# Patient Record
Sex: MALE | Race: WHITE | NOT HISPANIC OR LATINO | URBAN - METROPOLITAN AREA
[De-identification: names, ages, dates, MRNs, and addresses within clinical notes are randomized per-mention and may not be internally consistent; named-entity substitution may affect disease eponyms.]

---
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
## 2024-01-23 ENCOUNTER — OFFICE VISIT (OUTPATIENT)
Dept: PRIMARY CARE | Facility: CLINIC | Age: 19
End: 2024-01-23
Payer: COMMERCIAL

## 2024-01-23 VITALS
BODY MASS INDEX: 18.97 KG/M2 | SYSTOLIC BLOOD PRESSURE: 104 MMHG | HEIGHT: 70 IN | HEART RATE: 70 BPM | OXYGEN SATURATION: 97 % | WEIGHT: 132.5 LBS | TEMPERATURE: 98.4 F | DIASTOLIC BLOOD PRESSURE: 66 MMHG

## 2024-01-23 DIAGNOSIS — F64.9 GENDER DYSPHORIA: Primary | ICD-10-CM

## 2024-01-23 PROCEDURE — 1036F TOBACCO NON-USER: CPT | Performed by: STUDENT IN AN ORGANIZED HEALTH CARE EDUCATION/TRAINING PROGRAM

## 2024-01-23 PROCEDURE — 99214 OFFICE O/P EST MOD 30 MIN: CPT | Performed by: STUDENT IN AN ORGANIZED HEALTH CARE EDUCATION/TRAINING PROGRAM

## 2024-01-23 ASSESSMENT — PAIN SCALES - GENERAL: PAINLEVEL: 0-NO PAIN

## 2024-01-23 NOTE — PROGRESS NOTES
Subjective   Patient ID: Jae Levi is a 19 y.o. adult with a hx of gender dysphoria who presents for Follow-up.  HPI    Gender dysphoria  - as senior in high school started feeling discomfort with body, appearance and hair  - bothered by masculinity, started to grow hair out   - attempted to dress in a manner that fits with feelings   - first semester in college; spoke to therapist and wants to seek out gender affirming care  - has not considered fertility preservation       PMH: denies  Psurghx: tooth extraction   All: denies   Meds: doxycyline for acne  FH: alcohol abuse disorder   SH: lives with parents, doesn't work and not in school, denies tobacco use, very rare alcohol use, denies marijuana use, denies illicit drug use       Objective   Physical Exam  Constitutional:       General: She is not in acute distress.  HENT:      Head: Normocephalic and atraumatic.      Right Ear: Tympanic membrane normal. There is no impacted cerumen.      Left Ear: Tympanic membrane normal. There is no impacted cerumen.      Mouth/Throat:      Mouth: Mucous membranes are moist.   Eyes:      Extraocular Movements: Extraocular movements intact.      Pupils: Pupils are equal, round, and reactive to light.   Cardiovascular:      Rate and Rhythm: Normal rate and regular rhythm.   Pulmonary:      Effort: Pulmonary effort is normal. No respiratory distress.   Abdominal:      General: Bowel sounds are normal. There is no distension.      Palpations: Abdomen is soft.      Tenderness: There is no abdominal tenderness. There is no guarding.   Musculoskeletal:         General: Normal range of motion.   Skin:     General: Skin is warm.      Findings: No erythema or lesion.   Neurological:      General: No focal deficit present.      Mental Status: She is alert.      Motor: No weakness.       /66 (BP Location: Right arm, Patient Position: Sitting, BP Cuff Size: Small adult)   Pulse 70   Temp 36.9 °C (98.4 °F) (Temporal)   Ht  "1.778 m (5' 10\")   Wt 60.1 kg (132 lb 8 oz)   SpO2 97%   BMI 19.01 kg/m²     Assessment/Plan   Problem List Items Addressed This Visit       Gender dysphoria - Primary     - patient given resources on transitioning process  - has never considered fertility preservation; will consider now and discuss next visit   - concerned about family dynamic; wants visit billed in a way that won't alert patients father   - patient informed that transitioning is a noticeable process and that conversations with close family and friends are encouraged   - also mentioned that if process moves forward, Bicalutamide should be used for anti-testosterone; patient appears open to input and other medications.   - baseline labs: CBC, CMP, estradiol and testosterone ordered          Relevant Orders    CBC and Auto Differential (Completed)    Comprehensive Metabolic Panel (Completed)    Estradiol (Completed)    Testosterone, total and free     RTC in 1-2 months with Dr. Schultz or Dr. Vargas    Seen and discussed with Dr. Rupesh Lazo  Family Medicine, PGY-3         "

## 2024-01-30 ENCOUNTER — LAB (OUTPATIENT)
Dept: LAB | Facility: LAB | Age: 19
End: 2024-01-30
Payer: COMMERCIAL

## 2024-01-30 DIAGNOSIS — F64.9 GENDER DYSPHORIA: ICD-10-CM

## 2024-01-30 LAB
ALBUMIN SERPL BCP-MCNC: 5 G/DL (ref 3.4–5)
ALP SERPL-CCNC: 55 U/L (ref 33–120)
ALT SERPL W P-5'-P-CCNC: 10 U/L (ref 7–52)
ANION GAP SERPL CALC-SCNC: 12 MMOL/L (ref 10–20)
AST SERPL W P-5'-P-CCNC: 10 U/L (ref 9–39)
BILIRUB SERPL-MCNC: 0.6 MG/DL (ref 0–1.2)
BUN SERPL-MCNC: 13 MG/DL (ref 6–23)
CALCIUM SERPL-MCNC: 10.3 MG/DL (ref 8.6–10.6)
CHLORIDE SERPL-SCNC: 106 MMOL/L (ref 98–107)
CO2 SERPL-SCNC: 29 MMOL/L (ref 21–32)
CREAT SERPL-MCNC: 0.92 MG/DL (ref 0.5–1.3)
EGFRCR SERPLBLD CKD-EPI 2021: >90 ML/MIN/1.73M*2
ESTRADIOL SERPL-MCNC: 29 PG/ML
GLUCOSE SERPL-MCNC: 88 MG/DL (ref 74–99)
POTASSIUM SERPL-SCNC: 4.4 MMOL/L (ref 3.5–5.3)
PROT SERPL-MCNC: 7.5 G/DL (ref 6.4–8.2)
SODIUM SERPL-SCNC: 143 MMOL/L (ref 136–145)

## 2024-01-30 PROCEDURE — 85025 COMPLETE CBC W/AUTO DIFF WBC: CPT

## 2024-01-30 PROCEDURE — 36415 COLL VENOUS BLD VENIPUNCTURE: CPT

## 2024-01-30 PROCEDURE — 80053 COMPREHEN METABOLIC PANEL: CPT

## 2024-01-30 PROCEDURE — 82670 ASSAY OF TOTAL ESTRADIOL: CPT

## 2024-01-30 PROCEDURE — 84402 ASSAY OF FREE TESTOSTERONE: CPT

## 2024-01-31 LAB
BASOPHILS # BLD AUTO: 0.04 X10*3/UL (ref 0–0.1)
BASOPHILS NFR BLD AUTO: 0.9 %
EOSINOPHIL # BLD AUTO: 0.13 X10*3/UL (ref 0–0.7)
EOSINOPHIL NFR BLD AUTO: 2.8 %
ERYTHROCYTE [DISTWIDTH] IN BLOOD BY AUTOMATED COUNT: 12.3 % (ref 11.5–14.5)
HCT VFR BLD AUTO: 44.8 % (ref 36–52)
HGB BLD-MCNC: 15.1 G/DL (ref 12–17.5)
IMM GRANULOCYTES # BLD AUTO: 0 X10*3/UL (ref 0–0.7)
IMM GRANULOCYTES NFR BLD AUTO: 0 % (ref 0–0.9)
LYMPHOCYTES # BLD AUTO: 1.52 X10*3/UL (ref 1.2–4.8)
LYMPHOCYTES NFR BLD AUTO: 33.2 %
MCH RBC QN AUTO: 30.3 PG (ref 26–34)
MCHC RBC AUTO-ENTMCNC: 33.7 G/DL (ref 32–36)
MCV RBC AUTO: 90 FL (ref 80–100)
MONOCYTES # BLD AUTO: 0.49 X10*3/UL (ref 0.1–1)
MONOCYTES NFR BLD AUTO: 10.7 %
NEUTROPHILS # BLD AUTO: 2.4 X10*3/UL (ref 1.2–7.7)
NEUTROPHILS NFR BLD AUTO: 52.4 %
NRBC BLD-RTO: 0 /100 WBCS (ref 0–0)
PLATELET # BLD AUTO: 216 X10*3/UL (ref 150–450)
RBC # BLD AUTO: 4.99 X10*6/UL (ref 4–5.9)
WBC # BLD AUTO: 4.6 X10*3/UL (ref 4.4–11.3)

## 2024-02-01 PROBLEM — F64.9 GENDER DYSPHORIA: Status: ACTIVE | Noted: 2024-02-01

## 2024-02-01 NOTE — ASSESSMENT & PLAN NOTE
- patient given resources on transitioning process  - has never considered fertility preservation; will consider now and discuss next visit   - concerned about family dynamic; wants visit billed in a way that won't alert patients father   - patient informed that transitioning is a noticeable process and that conversations with close family and friends are encouraged   - also mentioned that if process moves forward, Bicalutamide should be used for anti-testosterone; patient appears open to input and other medications.   - baseline labs: CBC, CMP, estradiol and testosterone ordered

## 2024-02-02 NOTE — PROGRESS NOTES
I saw and evaluated the patient. I personally obtained the key and critical portions of the history and physical exam or was physically present for key and critical portions performed by the resident/fellow. I reviewed the resident/fellow's documentation and discussed the patient with the resident/fellow. I agree with the resident/fellow's medical decision making as documented in the note.    Jaimee Goddard MD

## 2024-02-03 LAB
TESTOSTERONE FREE (CHAN): 101.1 PG/ML (ref 35–155)
TESTOSTERONE,TOTAL,LC-MS/MS: 581 NG/DL (ref 250–1100)

## 2024-02-05 ENCOUNTER — TELEMEDICINE (OUTPATIENT)
Dept: PRIMARY CARE | Facility: CLINIC | Age: 19
End: 2024-02-05
Payer: COMMERCIAL

## 2024-02-05 VITALS — HEIGHT: 70 IN | BODY MASS INDEX: 19.01 KG/M2

## 2024-02-05 DIAGNOSIS — E34.9 HORMONE IMBALANCE: Primary | ICD-10-CM

## 2024-02-05 PROCEDURE — 99213 OFFICE O/P EST LOW 20 MIN: CPT | Performed by: STUDENT IN AN ORGANIZED HEALTH CARE EDUCATION/TRAINING PROGRAM

## 2024-02-05 PROCEDURE — 1036F TOBACCO NON-USER: CPT | Performed by: STUDENT IN AN ORGANIZED HEALTH CARE EDUCATION/TRAINING PROGRAM

## 2024-02-05 ASSESSMENT — PAIN SCALES - GENERAL: PAINLEVEL: 0-NO PAIN

## 2024-02-05 NOTE — PROGRESS NOTES
"Subjective   Patient ID: Jae Levi is a 19 y.o. adult who presents for Establish Care.    HPI  Arjun (she/her, they/them)    Gender Story:  Was 17-17yo when realized that gender was incongruent. Even before she realized, she doesn't know why it took that long because was saving resources like voice training related stuff and hadn't even thought about it then. Just at the tail end of the lockdown as people started returning to school. During pandemic, started growing hair out and people told her that it made her look more androgenis, which she liked a lot. She was friends with a lot of transwomen through the internet. Has been wanting to seek out gender affirming care for a while. Spent a year in college in [facility] and while she was there she spoke to her therapist about it and told him that she was interested in seeking care, but didn't have any money and was worried about what her parents would think and couldn't move forward very quickly with it. And eventually, she worked for a bit and saved up some money, and started to get in contact with places. Asked therapist eventually what the best place was for referral and tried to contact [facility] and was never able to make an appointment. A lot of friends knows, trans-girlfriend knows, and mom knows, but not dad. Considered about billing for appointment. Friends are supportive and chill about it. Mom thinks it is a \"social contagen\". She is not malicious about it, she just doesn't want it to be the case. She has been giving suggestions that it was something else, like a \"lack of Vitamin D\". Girlfriend is supportive. People are aware that she is trans, but not presenting more female because she has a disconnect with way she is feeling and way she knows she looks.     Gender Goals:  - Does not have much bottom dysphoria; not interested in bottom surgery  - Considering FFS  - Considering Vocal Cord training  - Would prefer a more feminine figure; would like wider " "hips and breast development  - Very naturally hairy which is one of the largest contributing factors of dysphoria (seeing a dermatologist later this month for laser - particularly for face)  - Would like injectable estrogen and spironolactone    Medical History:  - Takes doxycline for acne and occasionally misses doses    Family History:  - None    Social History:  - Has not been sexually attractive  - Attracted to all genders (bisexual?)  - Fertility preservation not interested  - No smoking, drinking, or other drug use  - Live with parents at home  - Does see therapist with Gender Dysphoria, Dr. Jordin Hogue at [facility] at [place]; interested in possible  referral    Review of Systems    Objective   Physical Exam  Ht 1.778 m (5' 10\")   BMI 19.01 kg/m²      Assessment/Plan   Problem List Items Addressed This Visit    None    20yo transfemale presenting to establish care for new PCP and to discuss gender dysphoria.    #Gender Dysphoria  #Hormone Imbalance  - Based on WPATH guidelines, patient does meet criteria to initiate feminizing hormone therapy. She has capacity to provide informed consent and meets the diagnosis of gender dysphoria as follows: Has a marked incongruence between experienced/expressed gender and primary and/or secondary sex characteristics; 2. A strong desire to be of the other gender and 3. A strong desire to be treated as the other gender. This has caused clinically significant distress  - We discussed feminizing hormone therapy at length today. Discussed how changes in the body take several months to become noticeable, and usually take up to 3-5 years to be complete. We spoke about risks of treatment, including but not limited to blood clots, increase in liver function tests, obesity, cardiovascular disease, infertility  - Informed consent and information on estradiol emailed to patient for review; would like to start estradiol valerate IM (18g to draw up medicaiton and " 22g for injections) and spironolactone   - Unsure if wants to bill insurance or pay cash because she is on her father's insurance and is not sure she wants him to see diagnosis     Plan to follow-up in 2 weeks for [facility] visit for injection training.    Patient discussed with Dr. Carine Vargas MD   Resident Physician, PGY4  Family and Preventive Medicine

## 2024-02-08 NOTE — PROGRESS NOTES
C/o migraine for last few days, pt got imitrex shot last night is out of pills    I reviewed the resident/fellow's documentation and discussed the patient with the resident/fellow. I agree with the resident/fellow's medical decision making as documented in the note.    Charan Hawk MD

## 2024-02-13 ENCOUNTER — TELEPHONE (OUTPATIENT)
Dept: PRIMARY CARE | Facility: CLINIC | Age: 19
End: 2024-02-13
Payer: COMMERCIAL

## 2024-02-13 DIAGNOSIS — F64.9 GENDER DYSPHORIA: Primary | ICD-10-CM

## 2024-02-14 DIAGNOSIS — F64.9 GENDER DYSPHORIA: Primary | ICD-10-CM

## 2024-02-14 RX ORDER — ESTRADIOL VALERATE 20 MG/ML
2.5 INJECTION INTRAMUSCULAR
Qty: 0.52 ML | Refills: 2 | Status: SHIPPED | OUTPATIENT
Start: 2024-02-14 | End: 2024-05-09 | Stop reason: SDUPTHER

## 2024-02-14 RX ORDER — NEEDLES, DISPOSABLE 27GX1/2"
1 NEEDLE, DISPOSABLE MISCELLANEOUS
Qty: 4 EACH | Refills: 2 | Status: SHIPPED | OUTPATIENT
Start: 2024-02-14 | End: 2024-05-09 | Stop reason: SDUPTHER

## 2024-02-14 RX ORDER — SPIRONOLACTONE 50 MG/1
50 TABLET, FILM COATED ORAL DAILY
Qty: 90 TABLET | Refills: 1 | Status: SHIPPED | OUTPATIENT
Start: 2024-02-14 | End: 2024-05-06 | Stop reason: SDUPTHER

## 2024-03-04 ENCOUNTER — TELEMEDICINE (OUTPATIENT)
Dept: PRIMARY CARE | Facility: CLINIC | Age: 19
End: 2024-03-04
Payer: COMMERCIAL

## 2024-03-04 DIAGNOSIS — F64.9 GENDER DYSPHORIA: Primary | ICD-10-CM

## 2024-03-04 PROCEDURE — 1036F TOBACCO NON-USER: CPT | Performed by: STUDENT IN AN ORGANIZED HEALTH CARE EDUCATION/TRAINING PROGRAM

## 2024-03-04 PROCEDURE — 99213 OFFICE O/P EST LOW 20 MIN: CPT | Performed by: STUDENT IN AN ORGANIZED HEALTH CARE EDUCATION/TRAINING PROGRAM

## 2024-03-04 ASSESSMENT — PAIN SCALES - GENERAL: PAINLEVEL: 0-NO PAIN

## 2024-03-04 NOTE — PROGRESS NOTES
"Subjective   Patient ID: Jae Levi \"Arjun\" is a 19 y.o. adult who presents for Follow-up.    HPI  Arjun (she/her, they/them)    Update:  - Doing well with injections; does have some needle phobia but has been numbing injection site with ice prior to injections which has made the injections better. Does not want to switch to other estrogen route.   - Does have some more frequent urination with spironolactone but tolerating well  - Some nipple/breast tenderness; no noticeable breast growth yet  - Decreased libido but still having adequate sexual function  - May be moving to [place] beginning of April    Gender Story:  Was [age]yo when realized that gender was incongruent. Even before she realized, she doesn't know why it took that long because was saving resources like voice training related stuff and hadn't even thought about it then. Just at the tail end of the lockdown as people started returning to school. During pandemic, started growing hair out and people told her that it made her look more androgenis, which she liked a lot. She was friends with a lot of transwomen through the internet. Has been wanting to seek out gender affirming care for a while. Spent a year in college in [facility] and while she was there she spoke to her therapist about it and told him that she was interested in seeking care, but didn't have any money and was worried about what her parents would think and couldn't move forward very quickly with it. And eventually, she worked for a bit and saved up some money, and started to get in contact with places. Asked therapist eventually what the best place was for referral and tried to contact [facility] and was never able to make an appointment. A lot of friends knows, trans-girlfriend knows, and mom knows, but not dad. Considered about billing for appointment. Friends are supportive and chill about it. Mom thinks it is a \"social contagen\". She is not malicious about it, she just doesn't want it " "to be the case. She has been giving suggestions that it was something else, like a \"lack of Vitamin D\". Girlfriend is supportive. People are aware that she is trans, but not presenting more female because she has a disconnect with way she is feeling and way she knows she looks.     Gender Goals:  - Does not have much bottom dysphoria; not interested in bottom surgery  - Considering FFS  - Considering Vocal Cord training  - Would prefer a more feminine figure; would like wider hips and breast development  - Very naturally hairy which is one of the largest contributing factors of dysphoria (seeing a dermatologist later this month for laser - particularly for face)  - Would like injectable estrogen and spironolactone    Medical History:  - Takes doxycline for acne and occasionally misses doses    Family History:  - None    Social History:  - Has not been sexually attractive  - Attracted to all genders (bisexual?)  - Fertility preservation not interested  - No smoking, drinking, or other drug use  - Live with parents at home  - Does see therapist with Gender Dysphoria, Dr. Jordin Hogue at [facility] at [place]; interested in possible  referral    Review of Systems    Objective   Physical Exam  There were no vitals taken for this visit.     Assessment/Plan   Problem List Items Addressed This Visit    None    20yo transfemale presenting to establish care for gender dysphoria.    #Gender Dysphoria  - Continue estradiol injections weekly  - Continue spironolactone daily  - Plan to assess renal function and estradiol/testosterone levels prior to next visit; ordered     Plan to follow-up in May 2024. Will follow-up sooner if does plan to move to [place] in April.     Patient discussed with Dr. Marion Vargas MD   Resident Physician, PGY4  Family and Preventive Medicine   "

## 2024-03-05 NOTE — PROGRESS NOTES
I reviewed the resident's documentation and discussed the patient with the resident. I agree with the resident's medical decision making as documented in the note.     Mouna Schultz MD

## 2024-05-06 ENCOUNTER — TELEMEDICINE (OUTPATIENT)
Dept: PRIMARY CARE | Facility: CLINIC | Age: 19
End: 2024-05-06
Payer: COMMERCIAL

## 2024-05-06 DIAGNOSIS — F64.9 GENDER DYSPHORIA: ICD-10-CM

## 2024-05-06 PROCEDURE — 99213 OFFICE O/P EST LOW 20 MIN: CPT | Performed by: STUDENT IN AN ORGANIZED HEALTH CARE EDUCATION/TRAINING PROGRAM

## 2024-05-06 ASSESSMENT — PAIN SCALES - GENERAL: PAINLEVEL: 0-NO PAIN

## 2024-05-06 NOTE — PROGRESS NOTES
"Subjective   Patient ID: Jae Levi \"Arjun\" is a 19 y.o. adult who presents for Follow-up.    HPI  Arjun (she/her, they/them)    Updates:  - Moved to [place] in April and will establish care in [place] moving forward  - Doing well with injections; does have some needle phobia but has been numbing injection site with ice prior to injections which has made the injections better  - Less frequent urination with spironolactone, tolerating well  - Some nipple/breast tenderness but happy with this; breast growth is noted  - Stable in regards to libido, but still having adequate sexual function and would not like to address this today    Gender Story:  Was 17-16yo when realized that gender was incongruent. Even before she realized, she doesn't know why it took that long because was saving resources like voice training related stuff and hadn't even thought about it then. Just at the tail end of the lockdown as people started returning to school. During pandemic, started growing hair out and people told her that it made her look more androgenis, which she liked a lot. She was friends with a lot of transwomen through the internet. Has been wanting to seek out gender affirming care for a while. Spent a year in college in [facility] and while she was there she spoke to her therapist about it and told him that she was interested in seeking care, but didn't have any money and was worried about what her parents would think and couldn't move forward very quickly with it. And eventually, she worked for a bit and saved up some money, and started to get in contact with places. Asked therapist eventually what the best place was for referral and tried to contact [facility] and was never able to make an appointment. A lot of friends knows, trans-girlfriend knows, and mom knows, but not dad. Considered about billing for appointment. Friends are supportive and chill about it. Mom thinks it is a \"social contagen\". She is not malicious about " "it, she just doesn't want it to be the case. She has been giving suggestions that it was something else, like a \"lack of Vitamin D\". Girlfriend is supportive. People are aware that she is trans, but not presenting more female because she has a disconnect with way she is feeling and way she knows she looks.     Gender Goals:  - Does not have much bottom dysphoria; not interested in bottom surgery  - Considering FFS  - Considering Vocal Cord training  - Would prefer a more feminine figure; would like wider hips and breast development  - Very naturally hairy which is one of the largest contributing factors of dysphoria (seeing a dermatologist later this month for laser - particularly for face)  - Would like injectable estrogen and spironolactone    Medical History:  - Takes doxycline for acne and occasionally misses doses    Family History:  - None    Social History:  - Has not been sexually attractive  - Attracted to all genders (bisexual?)  - Fertility preservation not interested  - No smoking, drinking, or other drug use  - Live with parents at home  - Does see therapist with Gender Dysphoria, Dr. Jordin Hogue at [facility] at [place]; interested in possible  referral    Review of Systems    Objective   Physical Exam  There were no vitals taken for this visit.     Assessment/Plan   Problem List Items Addressed This Visit    None    20yo transfemale presenting to establish care for gender dysphoria.    #Gender Dysphoria  - Continue estradiol injections weekly; last refill sent  - Continue spironolactone daily; last refill sent  - Discussed importance of assessing renal function and estradiol/ testosterone levels as soon as possible in [place] and establishing care with a new gender-care provider. Will print out lab requisition to get labs completed from [facility] and send results to me when resulted.   - Advised the [facility] in [facility] would be a great place to reach out to for further " resources in the area.    Patient discussed with Dr. Marion Vargas MD   Resident Physician, PGY4  Family and Preventive Medicine

## 2024-05-09 RX ORDER — NEEDLES, DISPOSABLE 27GX1/2"
1 NEEDLE, DISPOSABLE MISCELLANEOUS
Qty: 4 EACH | Refills: 2 | Status: SHIPPED | OUTPATIENT
Start: 2024-05-12

## 2024-05-09 RX ORDER — SPIRONOLACTONE 50 MG/1
50 TABLET, FILM COATED ORAL DAILY
Qty: 90 TABLET | Refills: 1 | Status: SHIPPED | OUTPATIENT
Start: 2024-05-09 | End: 2024-11-05

## 2024-05-09 RX ORDER — ESTRADIOL VALERATE 20 MG/ML
2.5 INJECTION INTRAMUSCULAR
Qty: 0.52 ML | Refills: 1 | Status: SHIPPED | OUTPATIENT
Start: 2024-05-12 | End: 2024-07-07